# Patient Record
Sex: MALE | Race: WHITE | NOT HISPANIC OR LATINO | URBAN - METROPOLITAN AREA
[De-identification: names, ages, dates, MRNs, and addresses within clinical notes are randomized per-mention and may not be internally consistent; named-entity substitution may affect disease eponyms.]

---

## 2018-06-03 ENCOUNTER — EMERGENCY (EMERGENCY)
Facility: HOSPITAL | Age: 48
LOS: 1 days | Discharge: HOME | End: 2018-06-03
Attending: EMERGENCY MEDICINE | Admitting: EMERGENCY MEDICINE
Payer: COMMERCIAL

## 2018-06-03 VITALS
RESPIRATION RATE: 18 BRPM | SYSTOLIC BLOOD PRESSURE: 145 MMHG | OXYGEN SATURATION: 99 % | DIASTOLIC BLOOD PRESSURE: 106 MMHG | HEART RATE: 82 BPM | TEMPERATURE: 98 F

## 2018-06-03 PROCEDURE — 99284 EMERGENCY DEPT VISIT MOD MDM: CPT

## 2018-06-03 PROCEDURE — 93971 EXTREMITY STUDY: CPT | Mod: 26,LT

## 2018-06-03 RX ORDER — OXYCODONE AND ACETAMINOPHEN 5; 325 MG/1; MG/1
1 TABLET ORAL ONCE
Qty: 0 | Refills: 0 | Status: DISCONTINUED | OUTPATIENT
Start: 2018-06-03 | End: 2018-06-03

## 2018-06-03 RX ADMIN — OXYCODONE AND ACETAMINOPHEN 1 TABLET(S): 5; 325 TABLET ORAL at 20:51

## 2018-06-03 NOTE — ED PROVIDER NOTE - ATTENDING CONTRIBUTION TO CARE
pt presenting with progressively worsening R knee pain for the last few weeks.  Had a cortisone injection from his ortho with minimal improvement and now has gotten significantly worse today.   Had same happen to other leg.  No trauma    Gen:  Well appearning in NAD  Head:  NC/AT  Resp: No distress   Ext: no deformities R knee is TTP posteriorly   Good distal PMS.  Skin: warm and dry as visualized     Pt with knee pain and wants MRI.  Explained that this is not possible through the ED at this time.  Would do an US to eval for bakers cyst or DVT while treating pain symptomatically.  Can follow up with his ortho this week.

## 2018-06-03 NOTE — ED PROVIDER NOTE - OBJECTIVE STATEMENT
47yo M w/ pmhx of HLD and Obesity, c/o right knee pain for the past month, pt went to Orthopedic surgeon 2 weeks ago, got x-ray, and received cortisone shot for probable wear and tear, since he had that in the other knee before which was then confirmed with MRI. However, unlike last time, pt reports that pain has been getting worst and now unable to bear weight on the leg. Denies any trauma, fever, chills, nausea, vomiting, calf pain, hx of DVT, or any other symptoms.

## 2018-06-03 NOTE — ED ADULT TRIAGE NOTE - CHIEF COMPLAINT QUOTE
BIBEMS c/o R knee pain x 5 weeks, 2 weeks ago pt had cortisone injection to knee, today about 1hr ago pt was unable to bear weight due to pain. Denies any recent injuries/exercising.